# Patient Record
Sex: MALE | URBAN - METROPOLITAN AREA
[De-identification: names, ages, dates, MRNs, and addresses within clinical notes are randomized per-mention and may not be internally consistent; named-entity substitution may affect disease eponyms.]

---

## 2024-06-24 ENCOUNTER — TELEPHONE (OUTPATIENT)
Dept: CARDIOTHORACIC SURGERY | Facility: CLINIC | Age: 44
End: 2024-06-24
Payer: MEDICAID

## 2024-06-24 NOTE — TELEPHONE ENCOUNTER
Called pt to schedule consultation appointment after receiving referral from Dr. Schrader. Pt scheduled for next available consultation appointment. Pt verbalized understanding of appointment date, time, and location.

## 2024-07-11 ENCOUNTER — OFFICE VISIT (OUTPATIENT)
Dept: CARDIOTHORACIC SURGERY | Facility: CLINIC | Age: 44
End: 2024-07-11
Payer: MEDICAID

## 2024-07-11 VITALS
DIASTOLIC BLOOD PRESSURE: 79 MMHG | WEIGHT: 186.5 LBS | HEIGHT: 68 IN | SYSTOLIC BLOOD PRESSURE: 136 MMHG | HEART RATE: 69 BPM | BODY MASS INDEX: 28.26 KG/M2

## 2024-07-11 DIAGNOSIS — I34.0 MITRAL VALVE INSUFFICIENCY, UNSPECIFIED ETIOLOGY: Primary | ICD-10-CM

## 2024-07-11 DIAGNOSIS — I48.0 PAROXYSMAL ATRIAL FIBRILLATION: ICD-10-CM

## 2024-07-11 DIAGNOSIS — I34.0 NONRHEUMATIC MITRAL VALVE REGURGITATION: ICD-10-CM

## 2024-07-11 PROCEDURE — 4010F ACE/ARB THERAPY RXD/TAKEN: CPT | Mod: CPTII,,, | Performed by: THORACIC SURGERY (CARDIOTHORACIC VASCULAR SURGERY)

## 2024-07-11 PROCEDURE — 3008F BODY MASS INDEX DOCD: CPT | Mod: CPTII,,, | Performed by: THORACIC SURGERY (CARDIOTHORACIC VASCULAR SURGERY)

## 2024-07-11 PROCEDURE — 99205 OFFICE O/P NEW HI 60 MIN: CPT | Mod: S$PBB,,, | Performed by: THORACIC SURGERY (CARDIOTHORACIC VASCULAR SURGERY)

## 2024-07-11 PROCEDURE — 3044F HG A1C LEVEL LT 7.0%: CPT | Mod: CPTII,,, | Performed by: THORACIC SURGERY (CARDIOTHORACIC VASCULAR SURGERY)

## 2024-07-11 PROCEDURE — 99213 OFFICE O/P EST LOW 20 MIN: CPT | Mod: PBBFAC | Performed by: THORACIC SURGERY (CARDIOTHORACIC VASCULAR SURGERY)

## 2024-07-11 PROCEDURE — 3075F SYST BP GE 130 - 139MM HG: CPT | Mod: CPTII,,, | Performed by: THORACIC SURGERY (CARDIOTHORACIC VASCULAR SURGERY)

## 2024-07-11 PROCEDURE — 99999 PR PBB SHADOW E&M-EST. PATIENT-LVL III: CPT | Mod: PBBFAC,,, | Performed by: THORACIC SURGERY (CARDIOTHORACIC VASCULAR SURGERY)

## 2024-07-11 PROCEDURE — 3078F DIAST BP <80 MM HG: CPT | Mod: CPTII,,, | Performed by: THORACIC SURGERY (CARDIOTHORACIC VASCULAR SURGERY)

## 2024-07-11 PROCEDURE — 1159F MED LIST DOCD IN RCRD: CPT | Mod: CPTII,,, | Performed by: THORACIC SURGERY (CARDIOTHORACIC VASCULAR SURGERY)

## 2024-07-11 RX ORDER — SPIRONOLACTONE 25 MG/1
1 TABLET ORAL DAILY
COMMUNITY
Start: 2024-06-04 | End: 2025-06-04

## 2024-07-11 RX ORDER — APIXABAN 5 MG/1
5 TABLET, FILM COATED ORAL 2 TIMES DAILY
COMMUNITY

## 2024-07-11 RX ORDER — FUROSEMIDE 20 MG/1
1 TABLET ORAL DAILY
COMMUNITY
Start: 2024-06-04

## 2024-07-11 RX ORDER — AMIODARONE HYDROCHLORIDE 200 MG/1
1 TABLET ORAL DAILY
COMMUNITY
Start: 2024-06-04 | End: 2025-06-04

## 2024-07-11 RX ORDER — LOSARTAN POTASSIUM 25 MG/1
25 TABLET ORAL DAILY
COMMUNITY

## 2024-07-11 RX ORDER — METOPROLOL SUCCINATE 100 MG/1
100 TABLET, EXTENDED RELEASE ORAL DAILY
COMMUNITY

## 2024-07-11 NOTE — PROGRESS NOTES
Subjective:      Patient ID: Amador Littlejohn is a 44 y.o. male.    Chief Complaint: New patient       HPI:  Amador Littlejohn is a 44 y.o. male who presents as an initial consult for severe mitral regurgitation and atrial fibrillation. He has a medical history significant for severe mitral regurgitation, atrial fibrillation, HFrEF, and NICM.  He originally presented to the ED with shortness of breath onset on 3/28 that has improved since onset. Patient was found to be in A-fib w/ RVR up to 150 w/ improvement in HR after being placed on Cardizem drip. Cardizem drip was stopped 3/29 and transitioned to rate control agents. Patient admitted for management and treatment of a-fib. HFrEF 20% - tachycardia induced cardiomyopathy? Had a dilated RV and reduced function, severely enlarged LA w/ severe MR. Patient is s/p cardiac catheterization 4/1/24 without evidence of CAD. Patient with MADISON 4/2/24 with no RUDY. LA is severely dilated with redundant mitral leaflet with diffuse cusp prolapse. s/p RHC (4/1) and MADISON with Cardioversion (4/2/2024: back to normal sinus rhythm). Established on GDMT regimen while in the hospital. Will be following up with Dr He in one month, follow up with CTS for assessment of valvular repair/replacement and Dr Rutledge for anti-arrhythmic and possible ablation. Currently on normal sinus rhythm and stable at the time of discharge.   Today, he presents with his wife and family to discuss possible need for surgical valve repair vs replacement, RUDY, [place] MAZE.  He had an angiogram which was negative for stenosis.      Family and social history reviewed    Review of patient's allergies indicates:   Allergen Reactions    Adhesive tape-silicones Rash     Past Medical History:   Diagnosis Date    Nonrheumatic mitral (valve) insufficiency     Paroxysmal atrial fibrillation      History reviewed. No pertinent surgical history.  Family History    None       Social History     Socioeconomic History    Marital  status:    Tobacco Use    Smoking status: Never    Smokeless tobacco: Never   Substance and Sexual Activity    Alcohol use: Never    Drug use: Never    Sexual activity: Not Currently     Social Determinants of Health     Food Insecurity: No Food Insecurity (3/28/2024)    Received from [facility]    Hunger Vital Sign     Worried About Running Out of Food in the Last Year: Never true     Ran Out of Food in the Last Year: Never true   Transportation Needs: No Transportation Needs (3/28/2024)    Received from [facility]    PRAPARE - Transportation     Lack of Transportation (Medical): No     Lack of Transportation (Non-Medical): No       Current Outpatient Medications:     ELIQUIS 5 mg Tab, Take 5 mg by mouth 2 (two) times daily., Disp: , Rfl:     empagliflozin (JARDIANCE) 10 mg tablet, Take 1 tablet by mouth once daily., Disp: , Rfl:     furosemide (LASIX) 20 MG tablet, Take 1 tablet by mouth once daily., Disp: , Rfl:     losartan (COZAAR) 25 MG tablet, Take 25 mg by mouth once daily., Disp: , Rfl:     metoprolol succinate (TOPROL-XL) 100 MG 24 hr tablet, Take 100 mg by mouth once daily., Disp: , Rfl:     spironolactone (ALDACTONE) 25 MG tablet, Take 1 tablet by mouth once daily., Disp: , Rfl:     amiodarone (PACERONE) 200 MG Tab, Take 1 tablet by mouth once daily. (Patient not taking: Reported on 7/11/2024), Disp: , Rfl:   Current medications Reviewed    Review of Systems   Constitutional:  Positive for fatigue. Negative for activity change, appetite change and fever.   HENT:  Negative for nosebleeds.    Respiratory:  Positive for shortness of breath. Negative for cough.    Cardiovascular:  Negative for chest pain, palpitations and leg swelling.   Gastrointestinal:  Negative for abdominal distention, abdominal pain and nausea.   Genitourinary:  Negative for frequency.   Musculoskeletal:  Negative for arthralgias and myalgias.   Skin:  Negative for rash.   Neurological:  Negative for dizziness and numbness.    Hematological:  Does not bruise/bleed easily.     Objective:   Physical Exam  HENT:      Head: Normocephalic and atraumatic.   Eyes:      Extraocular Movements: Extraocular movements intact.   Cardiovascular:      Rate and Rhythm: Normal rate and regular rhythm.   Pulmonary:      Effort: Pulmonary effort is normal.   Abdominal:      General: Abdomen is flat.      Palpations: Abdomen is soft.   Musculoskeletal:         General: Normal range of motion.      Cervical back: Normal range of motion.   Skin:     General: Skin is warm and dry.      Capillary Refill: Capillary refill takes less than 2 seconds.   Neurological:      General: No focal deficit present.         Diagnostic Results: Reviewed   Cardiac MRI: 6/12/2024  1.   Severe mitral valve regurgitation (limits 118 ml per heartbeat)with   prominent mitral valve calcifications (Juliet Type III morphology).   Bileaflet mitral valve without prolapse. There is enlargement of left   atrium.   2.  LV ejection fraction of 52%. No diastolic dysfunction on the time   volume curve.   3.   RV ejection fraction is 39%.   4.   No evidence of late gadolinium enhancement.     ECHO: 6/6/2024  Compared to prior echo report on 3/29/2024, changes are noted.   The left ventricle is mildly dilated with moderate eccentric hypertrophy.   The left ventricular ejection fraction is normal.   Ejection Fraction = 60-65%.     The left atrium is massively dilated.   The right ventricular systolic function is mildly reduced.     Mitral leaflet are redudant with morphology compatible with severe fibro-elastic degeneration.   Significant diffuse cusp prolaps.   There is severe mitral regurgitation along the whole comissure.     LHC: 4/2/2024  Normal coronary angiography   Normal bilateral filling pressure and cardiac output   Assessment:   Severe Mitral Regurgitation  Atrial Fibrillation  Plan:     CTS Attending Note:    I have personally taken the history and examined this patient and  agree with the JAMIE's note as stated above.  44-year-old male who presented to the ED with atrial fibrillation.  He has been found to have severe mitral regurgitation.  He does not have coronary artery disease as demonstrated on coronary angiography.  I recommended mitral valve repair and Maze procedure.  He agreed with this. We discussed the risks and benefits of the proposed procedure, including but not limited to death, stroke, respiratory complications, renal complications, arrythmia, need for permanent pacemaker, and infection. We discussed the STS predicted risk.  His questions have been answered, and he wishes to proceed.After a discussion of the advantages and disadvantages of tissue and mechanical prostheses, he is considering his options should repair not be feasible.  He and his wife indicated they would like to discuss further before committing to an operative date.

## 2024-08-07 ENCOUNTER — TELEPHONE (OUTPATIENT)
Dept: CARDIOTHORACIC SURGERY | Facility: CLINIC | Age: 44
End: 2024-08-07
Payer: MEDICAID

## 2024-09-30 ENCOUNTER — TELEPHONE (OUTPATIENT)
Dept: CARDIOTHORACIC SURGERY | Facility: CLINIC | Age: 44
End: 2024-09-30
Payer: MEDICAID

## 2024-09-30 NOTE — TELEPHONE ENCOUNTER
Attempted call to pt to follow up on available surgery dates. No answer, left VM with request for call back and provided my direct number.